# Patient Record
Sex: FEMALE | Race: WHITE | NOT HISPANIC OR LATINO | Employment: UNEMPLOYED | URBAN - METROPOLITAN AREA
[De-identification: names, ages, dates, MRNs, and addresses within clinical notes are randomized per-mention and may not be internally consistent; named-entity substitution may affect disease eponyms.]

---

## 2019-10-15 ENCOUNTER — SOCIAL WORK (OUTPATIENT)
Dept: ADMINISTRATIVE | Facility: OTHER | Age: 19
End: 2019-10-15

## 2019-10-15 NOTE — PROGRESS NOTES
SW met with pt regarding initial OB assessment. Pt stated this is her 1st pregnancy/0-miscarriage. Pt stated lives with her  and able to perform ADL's independently. Pt stated support system is her /Michael. Pt stated has( [facility]). Pt stated does have WIC. SW provide pt with information on other community resources.  No other needs identified at this time.    Sheridan Vivas,MSW  Pager#[phone]

## 2019-10-29 PROBLEM — Z86.59 HISTORY OF DEPRESSION: Status: ACTIVE | Noted: 2019-10-29

## 2019-10-29 PROBLEM — Z86.59 HISTORY OF ANOREXIA NERVOSA: Status: ACTIVE | Noted: 2019-10-29

## 2019-10-29 PROBLEM — Z34.91 NORMAL IUP (INTRAUTERINE PREGNANCY) ON PRENATAL ULTRASOUND, FIRST TRIMESTER: Status: ACTIVE | Noted: 2019-10-29

## 2019-10-29 PROBLEM — E04.9 ENLARGED THYROID: Status: ACTIVE | Noted: 2019-10-29

## 2019-12-28 PROBLEM — Z3A.16 16 WEEKS GESTATION OF PREGNANCY: Status: ACTIVE | Noted: 2019-12-28

## 2019-12-28 PROBLEM — O21.9 NAUSEA AND VOMITING DURING PREGNANCY: Status: ACTIVE | Noted: 2019-12-28

## 2019-12-28 PROBLEM — Z34.92 ENCOUNTER FOR SUPERVISION OF NORMAL PREGNANCY IN SECOND TRIMESTER: Status: ACTIVE | Noted: 2019-10-29

## 2020-02-11 PROBLEM — Z36.9 ENCOUNTER FOR ANTENATAL SCREENING: Status: ACTIVE | Noted: 2020-02-11

## 2020-03-04 PROBLEM — Z3A.25 25 WEEKS GESTATION OF PREGNANCY: Status: ACTIVE | Noted: 2019-12-28

## 2020-03-04 PROBLEM — O36.8120 DECREASED FETAL MOVEMENTS IN SECOND TRIMESTER: Status: ACTIVE | Noted: 2020-03-04

## 2020-04-05 PROBLEM — Z3A.30 30 WEEKS GESTATION OF PREGNANCY: Status: ACTIVE | Noted: 2019-12-28

## 2020-04-05 PROBLEM — Z03.71 ENCOUNTER FOR SUSPECTED PREMATURE RUPTURE OF MEMBRANES, WITH RUPTURE OF MEMBRANES NOT FOUND: Status: ACTIVE | Noted: 2020-04-05

## 2020-04-05 PROBLEM — Z34.93 ENCOUNTER FOR SUPERVISION OF NORMAL PREGNANCY IN THIRD TRIMESTER: Status: ACTIVE | Noted: 2019-10-29

## 2020-05-26 PROBLEM — O47.9 UTERINE CONTRACTIONS DURING PREGNANCY: Status: ACTIVE | Noted: 2020-05-26

## 2020-05-27 PROBLEM — Z03.71 ENCOUNTER FOR SUSPECTED PREMATURE RUPTURE OF MEMBRANES, WITH RUPTURE OF MEMBRANES NOT FOUND: Status: RESOLVED | Noted: 2020-04-05 | Resolved: 2020-05-27

## 2020-05-27 PROBLEM — O21.9 NAUSEA AND VOMITING DURING PREGNANCY: Status: RESOLVED | Noted: 2019-12-28 | Resolved: 2020-05-27

## 2020-05-27 PROBLEM — Z36.9 ENCOUNTER FOR ANTENATAL SCREENING: Status: RESOLVED | Noted: 2020-02-11 | Resolved: 2020-05-27

## 2020-05-27 PROBLEM — Z3A.37 37 WEEKS GESTATION OF PREGNANCY: Status: ACTIVE | Noted: 2019-12-28

## 2020-05-27 PROBLEM — O36.8120 DECREASED FETAL MOVEMENTS IN SECOND TRIMESTER: Status: RESOLVED | Noted: 2020-03-04 | Resolved: 2020-05-27

## 2020-10-27 PROBLEM — Z23 INFLUENZA VACCINATION ADMINISTERED AT CURRENT VISIT: Status: ACTIVE | Noted: 2020-10-27

## 2021-05-10 PROBLEM — Z3A.37 37 WEEKS GESTATION OF PREGNANCY: Status: RESOLVED | Noted: 2019-12-28 | Resolved: 2021-05-10

## 2021-06-08 PROBLEM — O47.9 UTERINE CONTRACTIONS DURING PREGNANCY: Status: RESOLVED | Noted: 2020-05-26 | Resolved: 2021-06-08

## 2021-06-08 PROBLEM — N92.1 IRREGULAR INTERMENSTRUAL BLEEDING: Status: ACTIVE | Noted: 2021-06-08

## 2021-06-08 PROBLEM — Z23 INFLUENZA VACCINATION ADMINISTERED AT CURRENT VISIT: Status: RESOLVED | Noted: 2020-10-27 | Resolved: 2021-06-08

## 2021-06-08 PROBLEM — Z34.93 ENCOUNTER FOR SUPERVISION OF NORMAL PREGNANCY IN THIRD TRIMESTER: Status: RESOLVED | Noted: 2019-10-29 | Resolved: 2021-06-08

## 2022-05-31 PROBLEM — R10.9 ABDOMINAL PAIN AFFECTING PREGNANCY: Status: ACTIVE | Noted: 2022-05-31

## 2022-05-31 PROBLEM — O26.899 ABDOMINAL PAIN AFFECTING PREGNANCY: Status: ACTIVE | Noted: 2022-05-31

## 2022-05-31 PROBLEM — Z34.91 ENCOUNTER FOR SUPERVISION OF LOW-RISK PREGNANCY IN FIRST TRIMESTER: Status: ACTIVE | Noted: 2022-05-31

## 2023-03-30 PROBLEM — M54.9 MID BACK PAIN, CHRONIC: Status: ACTIVE | Noted: 2023-03-30

## 2023-03-30 PROBLEM — G89.29 MID BACK PAIN, CHRONIC: Status: ACTIVE | Noted: 2023-03-30
